# Patient Record
Sex: FEMALE | Race: WHITE | Employment: UNEMPLOYED | URBAN - METROPOLITAN AREA
[De-identification: names, ages, dates, MRNs, and addresses within clinical notes are randomized per-mention and may not be internally consistent; named-entity substitution may affect disease eponyms.]

---

## 2022-12-02 ENCOUNTER — OFFICE VISIT (OUTPATIENT)
Dept: OBGYN CLINIC | Age: 22
End: 2022-12-02

## 2022-12-02 VITALS — WEIGHT: 126 LBS | BODY MASS INDEX: 22.32 KG/M2 | HEIGHT: 63 IN

## 2022-12-02 DIAGNOSIS — N63.10 MASS OF RIGHT BREAST, UNSPECIFIED QUADRANT: Primary | ICD-10-CM

## 2022-12-02 NOTE — PROGRESS NOTES
Problem Visit  Chief Complaint   Patient presents with    Breast Problem       Gato Lozoya is a 23 y.o.  presenting for problem visit. Her main concern today is breast cyst. She notes that 3 months ago she began developing breast cysts at the time of her menses. Breast cysts would come and go. For the past 2 weeks, the cyst has continued to grow and she notes that it is more tender to palpation. 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 She also has a family history significant for breast cancer (paternal grandmother). She has not seen a gynecologist in years (since she was 23). Per Nursing Note:  Ob/Gyn Hx:  LMP - Patient's last menstrual period was 2022 (exact date). Menses - regular  Contraception - none. SA - yes      Health Maintenance:  Last Pap: years ago- due for one. OB History    Para Term  AB Living   0 0 0 0 0 0   SAB IAB Ectopic Molar Multiple Live Births   0 0 0 0 0 0       History reviewed. No pertinent past medical history.     Past Surgical History:   Procedure Laterality Date    HX APPENDECTOMY  2019    HX WISDOM TEETH EXTRACTION         Family History   Problem Relation Age of Onset    Breast Cancer Paternal Grandmother        Social History     Socioeconomic History    Marital status: SINGLE     Spouse name: Not on file    Number of children: Not on file    Years of education: Not on file    Highest education level: Not on file   Occupational History    Not on file   Tobacco Use    Smoking status: Every Day     Types: Cigarettes    Smokeless tobacco: Never   Vaping Use    Vaping Use: Never used   Substance and Sexual Activity    Alcohol use: Yes    Drug use: Never    Sexual activity: Yes     Partners: Male     Birth control/protection: None   Other Topics Concern    Not on file   Social History Narrative    Not on file     Social Determinants of Health     Financial Resource Strain: Not on file   Food Insecurity: Not on file   Transportation Needs: Not on file   Physical Activity: Not on file   Stress: Not on file   Social Connections: Not on file   Intimate Partner Violence: Not on file   Housing Stability: Not on file     No Known Allergies    Review of Systems - History obtained from the patient  Constitutional: negative for weight loss, fever, night sweats  HEENT: negative for hearing loss, earache, congestion, snoring, sorethroat  CV: negative for chest pain, palpitations, edema  Resp: negative for cough, shortness of breath, wheezing  GI: negative for change in bowel habits, abdominal pain, black or bloody stools  : negative for frequency, dysuria, hematuria, vaginal discharge  MSK: negative for back pain, joint pain, muscle pain  Breast: negative for breast lumps, nipple discharge, galactorrhea  Skin :negative for itching, rash, hives  Neuro: negative for dizziness, headache, confusion, weakness  Psych: negative for anxiety, depression, change in mood  Heme/lymph: negative for bleeding, bruising, pallor    Physical Exam    Visit Vitals  Ht 5' 3\" (1.6 m)   Wt 126 lb (57.2 kg)   LMP 11/27/2022 (Exact Date)   BMI 22.32 kg/m²         OBGyn Exam      Constitutional  Appearance: well-nourished, well developed, alert, in no acute distress    HENT  Head and Face: appears normal    Neck  Inspection/Palpation: normal appearance, no masses or tenderness  Thyroid: gland size normal, nontender    Chest  Respiratory Effort: non-labored breathing    Breasts  Inspection of Breasts: breasts symmetrical, no skin changes, no discharge present, nipple appearance normal, no skin retraction present, tenderness to palpation under right breast, center under nipple over underlying rib, no abnormality palpable  Palpation of Breasts and Axillae: no masses present on palpation, no breast tenderness  Axillary Lymph Nodes: no lymphadenopathy present    Cardiovascular  Extremities: no peripheral edema    Gastrointestinal  Abdominal Examination: abdomen non-distended, non-tender to palpation, no masses present  Liver and spleen: no hepatomegaly present, spleen not palpable  Hernias: no hernias identified    Genitourinary deferred    Skin  General Inspection: no rash, no lesions identified    Neurologic/Psychiatric  Mental Status:  Orientation: grossly oriented to person, place and time  Mood and Affect: mood normal, affect appropriate      Assessment/Plan:  Alta Chamberlain is a 23 y.o. female who presents for evaluation due to concern over enlarging breast cyst  - Discussed physical exam findings with patient today. Exam notable for tenderness to palpation over rib however unable to palpate breast cyst described by patient. Given ongoing concern and duration of greater than 3 months, recommend further imaging with breast ultrasound.   - Order placed for breast ultrasound.  If normal, recommend routine follow up with breast imaging at 38 unless sooner clinical indication  - Recommend RV for annual exam    Rosemary Nielsen MD

## 2022-12-02 NOTE — PROGRESS NOTES
Tabitha Alvarado is a 23 y.o. female presents for a problem visit. Chief Complaint   Patient presents with    Breast Problem       The patient is reporting having: Breast Pain for 2  weeks. Ob/Gyn Hx:  LMP - Patient's last menstrual period was 11/27/2022 (exact date). Menses - regular  Contraception - none. SA - yes     Health Maintenance:  Last Pap: years ago- due for one.    1. Have you been to the ER, urgent care clinic, or hospitalized since your last visit? No    2. Have you seen or consulted any other health care providers outside of the [address] [place] [address] since your last visit? No    Patient declines chaperone.     Kyle Mcqueen